# Patient Record
Sex: FEMALE | Race: WHITE | HISPANIC OR LATINO
[De-identification: names, ages, dates, MRNs, and addresses within clinical notes are randomized per-mention and may not be internally consistent; named-entity substitution may affect disease eponyms.]

---
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
## 2022-01-27 ENCOUNTER — APPOINTMENT (OUTPATIENT)
Dept: NEUROLOGY | Facility: CLINIC | Age: 22
End: 2022-01-27
Payer: MEDICAID

## 2022-01-27 VITALS
OXYGEN SATURATION: 99 % | WEIGHT: 113 LBS | HEIGHT: 59 IN | HEART RATE: 76 BPM | DIASTOLIC BLOOD PRESSURE: 68 MMHG | SYSTOLIC BLOOD PRESSURE: 108 MMHG | BODY MASS INDEX: 22.78 KG/M2 | TEMPERATURE: 98.1 F

## 2022-01-27 DIAGNOSIS — Z83.3 FAMILY HISTORY OF DIABETES MELLITUS: ICD-10-CM

## 2022-01-27 DIAGNOSIS — Z80.9 FAMILY HISTORY OF MALIGNANT NEOPLASM, UNSPECIFIED: ICD-10-CM

## 2022-01-27 DIAGNOSIS — Z82.49 FAMILY HISTORY OF ISCHEMIC HEART DISEASE AND OTHER DISEASES OF THE CIRCULATORY SYSTEM: ICD-10-CM

## 2022-01-27 PROCEDURE — 99203 OFFICE O/P NEW LOW 30 MIN: CPT

## 2022-01-27 NOTE — CONSULT LETTER
[Dear  ___] : Dear  [unfilled], [Courtesy Letter:] : I had the pleasure of seeing your patient, [unfilled], in my office today. [Please see my note below.] : Please see my note below. [Sincerely,] : Sincerely, [FreeTextEntry3] : Angelica Ordonez NP\par [facility] [place]\par [address] [place] [address]\par Phone: [phone]; Fax: [phone]

## 2022-01-27 NOTE — REVIEW OF SYSTEMS
[Hand Weakness] :  hand weakness [Dizziness] : dizziness [Anxiety] : anxiety [Confused or Disoriented] : no confusion [Memory Lapses or Loss] : no memory loss [Decr. Concentrating Ability] : no decrease in concentrating ability [Difficulty with Language] : no ~M difficulty with language [Facial Weakness] : no facial weakness [Arm Weakness] : no arm weakness [Leg Weakness] : no leg weakness [Poor Coordination] : good coordination [Numbness] : no numbness [Seizures] : no convulsions [Difficulty Walking] : no difficulty walking [Frequent Falls] : not falling [Depression] : no depression [Eye Pain] : no eye pain [Eyesight Problems] : no eyesight problems [Earache] : no earache [Loss Of Hearing] : no hearing loss [Chest Pain] : no chest pain [Palpitations] : no palpitations [Cough] : no cough [Constipation] : no constipation [Diarrhea] : no diarrhea [Dysuria] : no dysuria [de-identified] : Chronic hand weakness secondary to surgery [FreeTextEntry4] : Occasional faint ringing in the years

## 2022-01-27 NOTE — DISCUSSION/SUMMARY
[FreeTextEntry1] : Ms. Jensen is a 21 year-old woman with no pertinent medical history who presents for evaluation of worsening headaches. Headaches are presumably tension-type. Neurological exam is intact. Perform MRI head to rule-out secondary cause of worsening headaches. Begin nortriptyline 10mg QHS x 2 weeks, then begin taking 20mg QHS. Continue Tylenol as needed. Follow-up with me in 4-6 weeks. \par \par She was instructed to call the office if her condition worsens, medication fails to improve symptoms or she experiences any new or concerning symptoms. She was educated on safety precautions and side effects of medication.  All of patient's questions and concerns were addressed. \par \par

## 2022-01-27 NOTE — HISTORY OF PRESENT ILLNESS
[FreeTextEntry1] : Ms. Jensen is a 21 year-old woman with no peritnent medical history who presents for evaluation of worsening headaches. She states that headaches began about a year ago but have recently increased in frequency and severity. She reports headaches occur in various areas on her head, including across her forehead, temples and posterior head and cause a pinching or stabbing-like pain that occurs daily. She denies associated light or sound sensitivity, nausea or vomiting. She takes Tylenol with moderate relief of pain. She reports occasional dizziness. She denies other new or concerning neurological symptoms. \par \par

## 2022-01-27 NOTE — PHYSICAL EXAM
[FreeTextEntry1] : GENERAL PHYSICAL EXAM:\par GEN: no distress, normal affect\par HEENT: NCAT, OP clear\par EYES: sclera white, conjunctiva clear, no nystagmus\par NECK: supple\par CV: normal rhythm\par PULM: no respiratory distress, normal rhythm and effort\par EXT: no edema, no cyanosis\par MSK: muscle tone and strength normal\par SKIN: warm, dry, no rash or lesion on exposed skin \par \par NEUROLOGICAL EXAM:\par Mental Status\par Orientation: alert and oriented to person, place, time, and situation\par Language: clear and fluent, intact comprehension and repetition, intact naming and reading\par \par Cranial Nerves\par II: visual fields full to confrontation \par III, IV, VI: PERRL, EOMI\par V, VII: facial sensation and movement intact and symmetric \par VIII: hearing intact \par IX, X: uvula midline, soft palate elevates normally \par XI: BL shoulder shrug intact \par XII: tongue midline\par \par Motor\par Shoulder abd: 5 (R), 5 (L)\par EF/EE: 5 (R), 5 (L)\par hand : 5 (R), 5 (L)\par HF/HE: 5 (R), 5 (L)\par KF/KE: 5 (R), 5 (L)\par DF/PF: 5 (R), 5 (L) \par Tone and bulk are normal in upper and lower limbs\par No pronator drift\par \par Sensation\par Intact to light touch in all 4 EXTs\par \par Reflex\par 2+ in BL biceps, brachioradialis, patella\par \par Coordination\par Normal FTN bilaterally\par Dysdiadochokinesia not present. \par Able to perform rapid, alternating movements\par \par Gait\par Normal stance, stride, and pivot turn\par Tandem walk intact\par Negative Romberg

## 2022-02-22 ENCOUNTER — APPOINTMENT (OUTPATIENT)
Dept: NEUROLOGY | Facility: CLINIC | Age: 22
End: 2022-02-22
Payer: MEDICAID

## 2022-02-22 VITALS
DIASTOLIC BLOOD PRESSURE: 68 MMHG | HEIGHT: 59 IN | TEMPERATURE: 97.6 F | OXYGEN SATURATION: 98 % | SYSTOLIC BLOOD PRESSURE: 102 MMHG | HEART RATE: 77 BPM | WEIGHT: 113 LBS | BODY MASS INDEX: 22.78 KG/M2

## 2022-02-22 PROCEDURE — 99213 OFFICE O/P EST LOW 20 MIN: CPT

## 2022-02-22 NOTE — REVIEW OF SYSTEMS
[Hand Weakness] :  hand weakness [Dizziness] : dizziness [Anxiety] : anxiety [Confused or Disoriented] : no confusion [Memory Lapses or Loss] : no memory loss [Decr. Concentrating Ability] : no decrease in concentrating ability [Difficulty with Language] : no ~M difficulty with language [Facial Weakness] : no facial weakness [Arm Weakness] : no arm weakness [Leg Weakness] : no leg weakness [Poor Coordination] : good coordination [Numbness] : no numbness [Seizures] : no convulsions [Difficulty Walking] : no difficulty walking [Frequent Falls] : not falling [Depression] : no depression [Eye Pain] : no eye pain [Eyesight Problems] : no eyesight problems [Earache] : no earache [Loss Of Hearing] : no hearing loss [Chest Pain] : no chest pain [Palpitations] : no palpitations [Cough] : no cough [Constipation] : no constipation [Diarrhea] : no diarrhea [Dysuria] : no dysuria [de-identified] : Chronic hand weakness secondary to surgery [FreeTextEntry4] : Occasional faint ringing in the years

## 2022-02-22 NOTE — DISCUSSION/SUMMARY
[FreeTextEntry1] : Ms. Jensen is a 21 year-old woman with no pertinent medical history who presented for follow-up of headaches. MRI head unremarkable. Continue nortriptyline 10mg QHS. Follow-up with me in 6 months or sooner should the need arise.  \par \par She was instructed to call the office if her condition worsens, medication fails to improve symptoms or she experiences any new or concerning symptoms. She was educated on safety precautions and side effects of medication.  All of patient's questions and concerns were addressed. \par \par

## 2022-02-22 NOTE — HISTORY OF PRESENT ILLNESS
[FreeTextEntry1] : INTERIM HISTORY: Since her last visit, MRI head was performed and was unremarkable. Headaches greatly improved with nortriptyline 10mg QHS. \par \par INITIAL OFFICE VISIT 1/27/22: Ms. Jensen is a 21 year-old woman with no peritnent medical history who presents for evaluation of worsening headaches. She states that headaches began about a year ago but have recently increased in frequency and severity. She reports headaches occur in various areas on her head, including across her forehead, temples and posterior head and cause a pinching or stabbing-like pain that occurs daily. She denies associated light or sound sensitivity, nausea or vomiting. She takes Tylenol with moderate relief of pain. She reports occasional dizziness. She denies other new or concerning neurological symptoms. \par \par

## 2022-02-22 NOTE — CONSULT LETTER
[Dear  ___] : Dear  [unfilled], [Courtesy Letter:] : I had the pleasure of seeing your patient, [unfilled], in my office today. [Please see my note below.] : Please see my note below. [Sincerely,] : Sincerely, [FreeTextEntry3] : Angelica Ordonez NP\par [facility] [place]\par [address] [place] [address]\par Phone: [phone]; Fax: [phone]

## 2022-04-09 ENCOUNTER — TRANSCRIPTION ENCOUNTER (OUTPATIENT)
Age: 22
End: 2022-04-09

## 2022-05-18 ENCOUNTER — NON-APPOINTMENT (OUTPATIENT)
Age: 22
End: 2022-05-18

## 2022-08-19 ENCOUNTER — APPOINTMENT (OUTPATIENT)
Dept: NEUROLOGY | Facility: CLINIC | Age: 22
End: 2022-08-19

## 2022-08-19 VITALS
BODY MASS INDEX: 22.18 KG/M2 | TEMPERATURE: 98.2 F | HEART RATE: 77 BPM | WEIGHT: 110 LBS | HEIGHT: 59 IN | SYSTOLIC BLOOD PRESSURE: 106 MMHG | DIASTOLIC BLOOD PRESSURE: 70 MMHG | OXYGEN SATURATION: 99 %

## 2022-08-19 PROCEDURE — 99213 OFFICE O/P EST LOW 20 MIN: CPT

## 2022-08-19 NOTE — HISTORY OF PRESENT ILLNESS
[FreeTextEntry1] : INTERIM HISTORY: Since her last visit, Ms. Jensen has been well. She had noted relief of headaches on nortriptyline until hitting her head last week on a shower curtain yaniv. She denies LOC or "seeing stars". Since then she reports sharp right frontal headache. Pain is about 5/10 and moderately relieved with Tylenol. She denies any other new or concerning neurological symptoms. \par \par INITIAL OFFICE VISIT 1/27/22: Ms. Jensen is a 21 year-old woman with no pertinent medical history who presents for evaluation of worsening headaches. She states that headaches began about a year ago but have recently increased in frequency and severity. She reports headaches occur in various areas on her head, including across her forehead, temples and posterior head and cause a pinching or stabbing-like pain that occurs daily. She denies associated light or sound sensitivity, nausea or vomiting. She takes Tylenol with moderate relief of pain. She reports occasional dizziness. She denies other new or concerning neurological symptoms. \par \par 2/22/22: Since her last visit, MRI head was performed and was unremarkable. Headaches greatly improved with nortriptyline 10mg QHS. \par \par \par

## 2022-08-19 NOTE — CONSULT LETTER
[Dear  ___] : Dear  [unfilled], [Courtesy Letter:] : I had the pleasure of seeing your patient, [unfilled], in my office today. [Please see my note below.] : Please see my note below. [Sincerely,] : Sincerely, [FreeTextEntry3] : Angelica Ordonez NP\par [facility] [place]\par [address] [place] [address]\par Phone: [phone]; Fax: [phone]

## 2022-08-19 NOTE — REVIEW OF SYSTEMS
[Hand Weakness] :  hand weakness [Dizziness] : dizziness [Anxiety] : anxiety [Confused or Disoriented] : no confusion [Memory Lapses or Loss] : no memory loss [Decr. Concentrating Ability] : no decrease in concentrating ability [Difficulty with Language] : no ~M difficulty with language [Facial Weakness] : no facial weakness [Arm Weakness] : no arm weakness [Leg Weakness] : no leg weakness [Poor Coordination] : good coordination [Numbness] : no numbness [Seizures] : no convulsions [Difficulty Walking] : no difficulty walking [Frequent Falls] : not falling [Depression] : no depression [Eye Pain] : no eye pain [Eyesight Problems] : no eyesight problems [Earache] : no earache [Loss Of Hearing] : no hearing loss [Chest Pain] : no chest pain [Palpitations] : no palpitations [Cough] : no cough [Constipation] : no constipation [Diarrhea] : no diarrhea [Dysuria] : no dysuria [de-identified] : Chronic hand weakness secondary to surgery [FreeTextEntry4] : Occasional faint ringing in the years

## 2022-10-13 NOTE — DISCUSSION/SUMMARY
[FreeTextEntry1] : Ms. Jensen is a 21 year-old woman with no pertinent medical history who presented for follow-up of headaches. MRI head unremarkable. Neurological exam intact. Continue nortriptyline 10mg QHS for now. We discussed increasing dose of nortriptyline to 20mg QHS if symptoms do not improve after a couple of weeks or if Tylenol/OTC NSAIDs does not manage discomfort. Follow-up with me in 6 months or sooner should the need arise.  \par \par She was instructed to call the office if her condition worsens, medication fails to improve symptoms or she experiences any new or concerning symptoms. She was educated on safety precautions and side effects of medication.  All of patient's questions and concerns were addressed. \par \par 
Prenatal Vitamins

## 2022-11-18 ENCOUNTER — APPOINTMENT (OUTPATIENT)
Dept: NEUROLOGY | Facility: CLINIC | Age: 22
End: 2022-11-18

## 2022-11-18 VITALS
HEART RATE: 85 BPM | HEIGHT: 59 IN | DIASTOLIC BLOOD PRESSURE: 71 MMHG | SYSTOLIC BLOOD PRESSURE: 104 MMHG | WEIGHT: 109 LBS | BODY MASS INDEX: 21.97 KG/M2 | OXYGEN SATURATION: 99 % | TEMPERATURE: 98.6 F

## 2022-11-18 PROCEDURE — 99213 OFFICE O/P EST LOW 20 MIN: CPT

## 2022-11-18 NOTE — CONSULT LETTER
[Dear  ___] : Dear  [unfilled], [Courtesy Letter:] : I had the pleasure of seeing your patient, [unfilled], in my office today. [Please see my note below.] : Please see my note below. [Sincerely,] : Sincerely, [FreeTextEntry3] : Angelica Ordonez NP\par [facility] [place]\par [address] [place] [address]\par Phone: [phone]; Fax: [phone]

## 2022-11-18 NOTE — DISCUSSION/SUMMARY
[FreeTextEntry1] : Ms. Jensen is a 22 year-old woman with no pertinent medical history who presented for follow-up of headaches. MRI head unremarkable. Neurological exam intact. Facial paresthesias likely 2/2 palpation and compression of trigeminal nerve. Continue nortriptyline 10mg QHS for now.  We discussed increasing dose of nortriptyline and evaluation if symptoms do not improve after a couple of weeks. Follow-up with me in 6 months or sooner should the need arise.  \par \par She was instructed to call the office if her condition worsens, medication fails to improve symptoms or she experiences any new or concerning symptoms. She was educated on safety precautions and side effects of medication.  All of patient's questions and concerns were addressed. \par \par

## 2022-11-18 NOTE — REVIEW OF SYSTEMS
[Hand Weakness] :  hand weakness [Dizziness] : dizziness [Anxiety] : anxiety [Confused or Disoriented] : no confusion [Memory Lapses or Loss] : no memory loss [Decr. Concentrating Ability] : no decrease in concentrating ability [Difficulty with Language] : no ~M difficulty with language [Facial Weakness] : no facial weakness [Arm Weakness] : no arm weakness [Leg Weakness] : no leg weakness [Poor Coordination] : good coordination [Numbness] : no numbness [Seizures] : no convulsions [Difficulty Walking] : no difficulty walking [Frequent Falls] : not falling [Depression] : no depression [Eye Pain] : no eye pain [Eyesight Problems] : no eyesight problems [Earache] : no earache [Loss Of Hearing] : no hearing loss [Chest Pain] : no chest pain [Palpitations] : no palpitations [Cough] : no cough [Constipation] : no constipation [Diarrhea] : no diarrhea [Dysuria] : no dysuria [de-identified] : Chronic hand weakness secondary to surgery [FreeTextEntry4] : Occasional faint ringing in the years

## 2022-11-18 NOTE — HISTORY OF PRESENT ILLNESS
[FreeTextEntry1] : INTERIM HISTORY: Since her last visit, Ms. Jensen has been well. She is rarely experiencing breakthrough headaches on nortriptyline. When she does they occur in the morning, resolve within a few minutes and do not require as needed medications. She reports tingling sensation to bilateral sides of face, from temple to upper cheek and around chin when palpating area just under temples. She states that this has occurred before and went away on its own. She denies any other new or concerning neurological symptoms. \par \par INITIAL OFFICE VISIT 1/27/22: Ms. Jensen is a 21 year-old woman with no pertinent medical history who presents for evaluation of worsening headaches. She states that headaches began about a year ago but have recently increased in frequency and severity. She reports headaches occur in various areas on her head, including across her forehead, temples and posterior head and cause a pinching or stabbing-like pain that occurs daily. She denies associated light or sound sensitivity, nausea or vomiting. She takes Tylenol with moderate relief of pain. She reports occasional dizziness. She denies other new or concerning neurological symptoms. \par \par 2/22/22: Since her last visit, MRI head was performed and was unremarkable. Headaches greatly improved with nortriptyline 10mg QHS. \par \par 8/18/22: Since her last visit, Ms. Jensen has been well. She had noted relief of headaches on nortriptyline until hitting her head last week on a shower curtain yaniv. She denies LOC or "seeing stars". Since then she reports sharp right frontal headache. Pain is about 5/10 and moderately relieved with Tylenol. She denies any other new or concerning neurological symptoms. \par \par \par

## 2023-05-26 ENCOUNTER — APPOINTMENT (OUTPATIENT)
Dept: NEUROLOGY | Facility: CLINIC | Age: 23
End: 2023-05-26
Payer: MEDICAID

## 2023-05-26 VITALS
HEART RATE: 68 BPM | WEIGHT: 103.5 LBS | SYSTOLIC BLOOD PRESSURE: 106 MMHG | DIASTOLIC BLOOD PRESSURE: 72 MMHG | BODY MASS INDEX: 20.87 KG/M2 | TEMPERATURE: 97.9 F | HEIGHT: 59 IN | OXYGEN SATURATION: 98 %

## 2023-05-26 VITALS — SYSTOLIC BLOOD PRESSURE: 106 MMHG | DIASTOLIC BLOOD PRESSURE: 72 MMHG

## 2023-05-26 VITALS — DIASTOLIC BLOOD PRESSURE: 74 MMHG | SYSTOLIC BLOOD PRESSURE: 105 MMHG

## 2023-05-26 VITALS — DIASTOLIC BLOOD PRESSURE: 71 MMHG | SYSTOLIC BLOOD PRESSURE: 102 MMHG

## 2023-05-26 PROCEDURE — 99213 OFFICE O/P EST LOW 20 MIN: CPT

## 2023-05-26 NOTE — HISTORY OF PRESENT ILLNESS
[FreeTextEntry1] : INTERIM HISTORY: Headaches remain well managed with nortripyline 10mg at bedtime. She continues to experience sensation of numbness/tingling just below and round her cheeks bilaterally. \par \par INITIAL OFFICE VISIT 1/27/22: Ms. Jensen is a 21 year-old woman with no pertinent medical history who presents for evaluation of worsening headaches. She states that headaches began about a year ago but have recently increased in frequency and severity. She reports headaches occur in various areas on her head, including across her forehead, temples and posterior head and cause a pinching or stabbing-like pain that occurs daily. She denies associated light or sound sensitivity, nausea or vomiting. She takes Tylenol with moderate relief of pain. She reports occasional dizziness. She denies other new or concerning neurological symptoms. \par \par 2/22/22: Since her last visit, MRI head was performed and was unremarkable. Headaches greatly improved with nortriptyline 10mg QHS. \par \par 8/18/22: Since her last visit, Ms. Jensen has been well. She had noted relief of headaches on nortriptyline until hitting her head last week on a shower curtain yaniv. She denies LOC or "seeing stars". Since then she reports sharp right frontal headache. Pain is about 5/10 and moderately relieved with Tylenol. She denies any other new or concerning neurological symptoms. \par \par 11/18/22: Since her last visit, Ms. Jensen has been well. She is rarely experiencing breakthrough headaches on nortriptyline. When she does they occur in the morning, resolve within a few minutes and do not require as needed medications. She reports tingling sensation to bilateral sides of face, from temple to upper cheek and around chin when palpating area just under temples. She states that this has occurred before and went away on its own. She denies any other new or concerning neurological symptoms. \par \par

## 2023-05-26 NOTE — CONSULT LETTER
[Dear  ___] : Dear  [unfilled], [Courtesy Letter:] : I had the pleasure of seeing your patient, [unfilled], in my office today. [Please see my note below.] : Please see my note below. [Sincerely,] : Sincerely, [FreeTextEntry3] : Angelica Ordonez NP\par [facility] [place]\par [address] [place] [address]\par Phone: [phone]; Fax: [phone]

## 2023-05-26 NOTE — DISCUSSION/SUMMARY
[FreeTextEntry1] : Ms. Jensen is a 22 year-old woman with no pertinent medical history who presented for follow-up of headaches. MRI head unremarkable. Neurological exam intact. Unclear etiology of facial paresthesias as symptoms are bilateral, intermittent and not associated with weakness or headaches. Continue nortriptyline 10mg QHS for now.  Follow-up with me in 6 months or sooner should the need arise.  \par \par She was instructed to call the office if her condition worsens, medication fails to improve symptoms or she experiences any new or concerning symptoms. She was educated on safety precautions and side effects of medication.  All of patient's questions and concerns were addressed. \par \par

## 2023-06-13 ENCOUNTER — NON-APPOINTMENT (OUTPATIENT)
Age: 23
End: 2023-06-13

## 2023-06-13 ENCOUNTER — APPOINTMENT (OUTPATIENT)
Dept: RHEUMATOLOGY | Facility: CLINIC | Age: 23
End: 2023-06-13
Payer: MEDICAID

## 2023-06-13 VITALS
WEIGHT: 101 LBS | TEMPERATURE: 97.3 F | OXYGEN SATURATION: 98 % | HEIGHT: 59 IN | BODY MASS INDEX: 20.36 KG/M2 | SYSTOLIC BLOOD PRESSURE: 100 MMHG | DIASTOLIC BLOOD PRESSURE: 42 MMHG | HEART RATE: 79 BPM

## 2023-06-13 DIAGNOSIS — I73.00 RAYNAUD'S SYNDROME W/OUT GANGRENE: ICD-10-CM

## 2023-06-13 PROCEDURE — 99204 OFFICE O/P NEW MOD 45 MIN: CPT

## 2023-06-13 NOTE — HISTORY OF PRESENT ILLNESS
[Weight Loss] : weight loss [Malar Facial Rash] : no malar facial rash [Skin Lesions] : no lesions [Skin Nodules] : no skin nodules [Oral Ulcers] : no oral ulcers [Dry Mouth] : no dry mouth [Arthralgias] : no arthralgias [Dry Eyes] : dry eyes [FreeTextEntry1] : Raynauds

## 2023-06-13 NOTE — REVIEW OF SYSTEMS
[Fever] : no fever [Chills] : no chills [Recent Weight Loss (___ Lbs)] : recent [unfilled] ~Ulb weight loss [Dry Eyes] : dryness of the eyes [Skin Lesions] : no skin lesions [As Noted in HPI] : as noted in HPI [Negative] : Heme/Lymph [de-identified] : Raynauds in hands and toes [de-identified] : chronic headaches

## 2023-06-13 NOTE — PHYSICAL EXAM
[General Appearance - Alert] : alert [General Appearance - In No Acute Distress] : in no acute distress [General Appearance - Well Developed] : well developed [General Appearance - Well-Appearing] : healthy appearing [Sclera] : the sclera and conjunctiva were normal [Extraocular Movements] : extraocular movements were intact [Outer Ear] : the ears and nose were normal in appearance [Neck Appearance] : the appearance of the neck was normal [Exaggerated Use Of Accessory Muscles For Inspiration] : no accessory muscle use [Edema] : there was no peripheral edema [Musculoskeletal - Swelling] : no joint swelling seen [FreeTextEntry1] : no joint tenderness or synovitis noted on exam today; full ROM of the joints. No significant hyperextensibility noted of the joints. [] : no rash [Skin Color & Pigmentation] : normal skin color and pigmentation [Skin Lesions] : no skin lesions [No Focal Deficits] : no focal deficits [Oriented To Time, Place, And Person] : oriented to person, place, and time [Affect] : the affect was normal [Mood] : the mood was normal

## 2023-06-13 NOTE — ASSESSMENT
[FreeTextEntry1] : 22F with chronic headaches, reported orthostatic hypotension, here for evaluation of Raynauds.\par Pt with red/bluish discoloration of b/l hands in cold, occasionally toes as well. Generally resolve with hand/foot warming.\par \par Patient was advised that Raynauds can be primary (occur as an isolated condition) or secondary to an autoimmune condition such as SLE, Sjogrens, scleroderma or APLS. Given patient's young age and symptoms, will test for these above conditions.\par \par Patient was advised to keep hands/feet warm and to call our office if she notes any digital ulcerations because then treatment will be necessary. At this time, due to her low blood pressure and relatively mild symptoms, calcium channel blockers are contraindicated. \par \par Further plan to follow pending results of labs.

## 2023-06-16 ENCOUNTER — LABORATORY RESULT (OUTPATIENT)
Age: 23
End: 2023-06-16

## 2023-06-22 ENCOUNTER — NON-APPOINTMENT (OUTPATIENT)
Age: 23
End: 2023-06-22

## 2023-06-22 LAB
ALBUMIN SERPL ELPH-MCNC: 4.7 G/DL
ALP BLD-CCNC: 61 U/L
ALT SERPL-CCNC: 13 U/L
ANA SER IF-ACNC: NEGATIVE
ANION GAP SERPL CALC-SCNC: 10 MMOL/L
APPEARANCE: CLEAR
AST SERPL-CCNC: 17 U/L
B2 GLYCOPROT1 AB SER QL: NEGATIVE
BILIRUB SERPL-MCNC: 0.4 MG/DL
BILIRUBIN URINE: NEGATIVE
BLOOD URINE: NEGATIVE
BUN SERPL-MCNC: 10 MG/DL
C3 SERPL-MCNC: 104 MG/DL
C4 SERPL-MCNC: 13 MG/DL
CALCIUM SERPL-MCNC: 9.7 MG/DL
CARDIOLIPIN AB SER IA-ACNC: NEGATIVE
CENTROMERE IGG SER-ACNC: <0.2 AL
CHLORIDE SERPL-SCNC: 107 MMOL/L
CO2 SERPL-SCNC: 25 MMOL/L
COLOR: YELLOW
CREAT SERPL-MCNC: 0.69 MG/DL
CREAT SPEC-SCNC: 244 MG/DL
CREAT/PROT UR: 0.1 RATIO
CRP SERPL-MCNC: <3 MG/L
DSDNA AB SER-ACNC: <12 IU/ML
EGFR: 127 ML/MIN/1.73M2
ENA RNP AB SER IA-ACNC: <0.2 AL
ENA SCL70 IGG SER IA-ACNC: <0.2 AL
ENA SM AB SER IA-ACNC: <0.2 AL
ENA SS-A AB SER IA-ACNC: <0.2 AL
ENA SS-B AB SER IA-ACNC: <0.2 AL
ERYTHROCYTE [SEDIMENTATION RATE] IN BLOOD BY WESTERGREN METHOD: 16 MM/HR
GLUCOSE QUALITATIVE U: NEGATIVE MG/DL
GLUCOSE SERPL-MCNC: 88 MG/DL
KETONES URINE: NEGATIVE MG/DL
LEUKOCYTE ESTERASE URINE: NEGATIVE
NITRITE URINE: NEGATIVE
PH URINE: 5.5
POTASSIUM SERPL-SCNC: 4.1 MMOL/L
PROT SERPL-MCNC: 7.3 G/DL
PROT UR-MCNC: 18 MG/DL
PROTEIN URINE: NORMAL MG/DL
RNA POLYMERASE III IGG: 8 UNITS
SODIUM SERPL-SCNC: 142 MMOL/L
SPECIFIC GRAVITY URINE: 1.03
UROBILINOGEN URINE: 0.2 MG/DL

## 2023-08-25 ENCOUNTER — RX ONLY (RX ONLY)
Age: 23
End: 2023-08-25

## 2023-08-25 ENCOUNTER — OFFICE (OUTPATIENT)
Dept: URBAN - METROPOLITAN AREA CLINIC 35 | Facility: CLINIC | Age: 23
Setting detail: OPHTHALMOLOGY
End: 2023-08-25
Payer: COMMERCIAL

## 2023-08-25 DIAGNOSIS — H00.025: ICD-10-CM

## 2023-08-25 DIAGNOSIS — H16.223: ICD-10-CM

## 2023-08-25 DIAGNOSIS — H52.13: ICD-10-CM

## 2023-08-25 DIAGNOSIS — H00.022: ICD-10-CM

## 2023-08-25 PROBLEM — H53.001 AMBLYOPIA; RIGHT EYE: Status: ACTIVE | Noted: 2023-08-25

## 2023-08-25 PROCEDURE — SCREF LASIK EVAL: Performed by: OPHTHALMOLOGY

## 2023-08-25 ASSESSMENT — REFRACTION_MANIFEST
OS_SPHERE: -6.00
OS_VA1: 20/20
OS_AXIS: 080
OD_SPHERE: -4.75
OD_AXIS: 090
OD_CYLINDER: +1.50
OD_VA1: 20/30-2
OS_VA1: 20/15-2
OD_AXIS: 095
OS_AXIS: 080
OD_VA1: 20/40+
OD_SPHERE: -5.00
OS_CYLINDER: +1.00
OS_SPHERE: -5.50
OD_CYLINDER: +1.50
OS_CYLINDER: +1.25

## 2023-08-25 ASSESSMENT — LID EXAM ASSESSMENTS
OD_COMMENTS: SHORTENED GLANDS
OS_COMMENTS: SHORTENED GLANDS
OD_MEIBOMITIS: RLL
OS_MEIBOMITIS: LLL

## 2023-08-25 ASSESSMENT — REFRACTION_AUTOREFRACTION
OS_SPHERE: -6.50
OD_CYLINDER: +1.75
OS_AXIS: 087
OD_AXIS: 089
OD_SPHERE: -5.50
OS_CYLINDER: +1.50

## 2023-08-25 ASSESSMENT — SPHEQUIV_DERIVED
OD_SPHEQUIV: -4
OD_SPHEQUIV: -4.625
OS_SPHEQUIV: -5
OS_SPHEQUIV: -5.375
OS_SPHEQUIV: -5.75
OD_SPHEQUIV: -4.25

## 2023-08-25 ASSESSMENT — REFRACTION_CURRENTRX
OS_SPHERE: -5.50
OS_CYLINDER: +1.25
OD_CYLINDER: +1.25
OS_VPRISM_DIRECTION: SV
OS_AXIS: 087
OD_AXIS: 101
OD_SPHERE: -4.75
OD_VPRISM_DIRECTION: SV
OS_OVR_VA: 20/
OD_OVR_VA: 20/

## 2023-08-25 ASSESSMENT — AXIALLENGTH_DERIVED
OS_AL: 26.2158
OS_AL: 26.5816
OD_AL: 26.2622
OD_AL: 26.0824
OS_AL: 26.3974
OD_AL: 25.9639

## 2023-08-25 ASSESSMENT — VISUAL ACUITY
OD_BCVA: 20/25-
OS_BCVA: 20/40+

## 2023-08-25 ASSESSMENT — KERATOMETRY
OD_K2POWER_DIOPTERS: 42.50
OD_K1POWER_DIOPTERS: 41.25
OS_K2POWER_DIOPTERS: 43.00
OS_K1POWER_DIOPTERS: 41.75
OS_AXISANGLE_DEGREES: 075
OD_AXISANGLE_DEGREES: 096

## 2023-08-25 ASSESSMENT — SUPERFICIAL PUNCTATE KERATITIS (SPK)
OD_SPK: 1+
OS_SPK: 1+

## 2023-08-25 ASSESSMENT — CONFRONTATIONAL VISUAL FIELD TEST (CVF)
OD_FINDINGS: FULL
OS_FINDINGS: FULL

## 2023-10-12 ENCOUNTER — APPOINTMENT (OUTPATIENT)
Dept: INTERVENTIONAL RADIOLOGY/VASCULAR | Facility: CLINIC | Age: 23
End: 2023-10-12

## 2023-10-13 ENCOUNTER — APPOINTMENT (OUTPATIENT)
Dept: ORTHOPEDIC SURGERY | Facility: CLINIC | Age: 23
End: 2023-10-13
Payer: MEDICAID

## 2023-10-13 VITALS
DIASTOLIC BLOOD PRESSURE: 72 MMHG | HEIGHT: 59 IN | BODY MASS INDEX: 20.36 KG/M2 | WEIGHT: 101 LBS | SYSTOLIC BLOOD PRESSURE: 115 MMHG | HEART RATE: 87 BPM

## 2023-10-13 DIAGNOSIS — M79.646 PAIN IN UNSPECIFIED HAND: ICD-10-CM

## 2023-10-13 DIAGNOSIS — M25.20 FLAIL JOINT, UNSPECIFIED JOINT: ICD-10-CM

## 2023-10-13 DIAGNOSIS — M79.643 PAIN IN UNSPECIFIED HAND: ICD-10-CM

## 2023-10-13 PROCEDURE — 99203 OFFICE O/P NEW LOW 30 MIN: CPT | Mod: 25

## 2023-10-13 PROCEDURE — 73130 X-RAY EXAM OF HAND: CPT | Mod: 50

## 2023-10-21 ENCOUNTER — OFFICE (OUTPATIENT)
Dept: URBAN - METROPOLITAN AREA CLINIC 34 | Facility: CLINIC | Age: 23
Setting detail: OPHTHALMOLOGY
End: 2023-10-21
Payer: COMMERCIAL

## 2023-10-21 DIAGNOSIS — H16.222: ICD-10-CM

## 2023-10-21 DIAGNOSIS — H52.13: ICD-10-CM

## 2023-10-21 DIAGNOSIS — H00.025: ICD-10-CM

## 2023-10-21 DIAGNOSIS — H00.022: ICD-10-CM

## 2023-10-21 DIAGNOSIS — H53.001: ICD-10-CM

## 2023-10-21 DIAGNOSIS — H35.40: ICD-10-CM

## 2023-10-21 PROCEDURE — SCREF LASIK EVAL: Performed by: OPHTHALMOLOGY

## 2023-10-21 ASSESSMENT — LID EXAM ASSESSMENTS
OS_COMMENTS: SHORTENED GLANDS
OS_MEIBOMITIS: LLL
OD_MEIBOMITIS: RLL
OD_COMMENTS: SHORTENED GLANDS

## 2023-10-21 ASSESSMENT — CONFRONTATIONAL VISUAL FIELD TEST (CVF)
OD_FINDINGS: FULL
OS_FINDINGS: FULL

## 2023-10-23 PROBLEM — H35.40 PERIPAPILLARY ATROPHY ; RIGHT EYE: Status: ACTIVE | Noted: 2023-10-21

## 2023-10-23 ASSESSMENT — REFRACTION_CURRENTRX
OD_OVR_VA: 20/
OS_CYLINDER: +1.25
OD_SPHERE: -4.75
OS_OVR_VA: 20/
OD_AXIS: 101
OS_AXIS: 087
OS_SPHERE: -5.50
OS_VPRISM_DIRECTION: SV
OD_CYLINDER: +1.25
OD_VPRISM_DIRECTION: SV

## 2023-10-23 ASSESSMENT — SPHEQUIV_DERIVED
OS_SPHEQUIV: -5.125
OD_SPHEQUIV: -4
OD_SPHEQUIV: -4.375
OS_SPHEQUIV: -5
OD_SPHEQUIV: -4
OD_SPHEQUIV: -4.25
OS_SPHEQUIV: -5.625
OS_SPHEQUIV: -5.375

## 2023-10-23 ASSESSMENT — REFRACTION_MANIFEST
OS_VA1: 20/15-2
OD_CYLINDER: +1.50
OD_VA1: 20/40+
OD_SPHERE: -4.75
OS_CYLINDER: +1.25
OS_VA1: 20/20
OS_VA1: 20/20+2
OD_AXIS: 090
OS_CYLINDER: +1.00
OD_VA1: 20/30-2
OD_CYLINDER: +1.50
OS_AXIS: 085
OS_AXIS: 080
OD_CYLINDER: +1.50
OS_SPHERE: -5.75
OD_SPHERE: -5.00
OS_AXIS: 080
OD_VA1: 20/40-1
OD_AXIS: 090
OD_SPHERE: -4.75
OS_SPHERE: -6.00
OS_CYLINDER: +1.25
OS_SPHERE: -5.50
OD_AXIS: 095

## 2023-10-23 ASSESSMENT — REFRACTION_AUTOREFRACTION
OS_SPHERE: -6.25
OS_AXIS: 080
OD_AXIS: 089
OS_CYLINDER: +1.25
OD_SPHERE: -5.25
OD_CYLINDER: +1.75

## 2023-10-23 ASSESSMENT — AXIALLENGTH_DERIVED
OD_AL: 25.9151
OS_AL: 26.1029
OD_AL: 25.7981
OD_AL: 25.974
OS_AL: 26.4044
OS_AL: 26.1626
OD_AL: 25.7981
OS_AL: 26.283

## 2023-10-23 ASSESSMENT — KERATOMETRY
OD_K2POWER_DIOPTERS: 43.00
OD_AXISANGLE_DEGREES: 095
OS_K2POWER_DIOPTERS: 43.25
OD_K1POWER_DIOPTERS: 41.50
OS_AXISANGLE_DEGREES: 073
OS_K1POWER_DIOPTERS: 42.00

## 2023-10-23 ASSESSMENT — VISUAL ACUITY
OD_BCVA: 20/20-1
OS_BCVA: 20/40+2

## 2024-04-01 ENCOUNTER — EMERGENCY (EMERGENCY)
Facility: HOSPITAL | Age: 24
LOS: 1 days | Discharge: HOME | End: 2024-04-01
Attending: STUDENT IN AN ORGANIZED HEALTH CARE EDUCATION/TRAINING PROGRAM | Admitting: STUDENT IN AN ORGANIZED HEALTH CARE EDUCATION/TRAINING PROGRAM
Payer: MEDICAID

## 2024-04-01 VITALS
RESPIRATION RATE: 16 BRPM | DIASTOLIC BLOOD PRESSURE: 77 MMHG | HEART RATE: 77 BPM | SYSTOLIC BLOOD PRESSURE: 118 MMHG | OXYGEN SATURATION: 99 % | HEIGHT: 59 IN | TEMPERATURE: 98 F | WEIGHT: 104.94 LBS

## 2024-04-01 DIAGNOSIS — Q66.89 OTHER SPECIFIED CONGENITAL DEFORMITIES OF FEET: Chronic | ICD-10-CM

## 2024-04-01 DIAGNOSIS — Z98.890 OTHER SPECIFIED POSTPROCEDURAL STATES: Chronic | ICD-10-CM

## 2024-04-01 DIAGNOSIS — K08.409 PARTIAL LOSS OF TEETH, UNSPECIFIED CAUSE, UNSPECIFIED CLASS: Chronic | ICD-10-CM

## 2024-04-01 LAB
ALBUMIN SERPL ELPH-MCNC: 4.2 G/DL — SIGNIFICANT CHANGE UP (ref 3.3–5)
ALP SERPL-CCNC: 65 U/L — SIGNIFICANT CHANGE UP (ref 40–120)
ALT FLD-CCNC: 27 U/L — SIGNIFICANT CHANGE UP (ref 12–78)
ANION GAP SERPL CALC-SCNC: 7 MMOL/L — SIGNIFICANT CHANGE UP (ref 5–17)
APPEARANCE UR: CLEAR — SIGNIFICANT CHANGE UP
AST SERPL-CCNC: 22 U/L — SIGNIFICANT CHANGE UP (ref 15–37)
BASOPHILS # BLD AUTO: 0.04 K/UL — SIGNIFICANT CHANGE UP (ref 0–0.2)
BASOPHILS NFR BLD AUTO: 0.6 % — SIGNIFICANT CHANGE UP (ref 0–2)
BILIRUB SERPL-MCNC: 0.4 MG/DL — SIGNIFICANT CHANGE UP (ref 0.2–1.2)
BILIRUB UR-MCNC: NEGATIVE — SIGNIFICANT CHANGE UP
BUN SERPL-MCNC: 9 MG/DL — SIGNIFICANT CHANGE UP (ref 7–23)
CALCIUM SERPL-MCNC: 8.5 MG/DL — SIGNIFICANT CHANGE UP (ref 8.5–10.1)
CHLORIDE SERPL-SCNC: 113 MMOL/L — HIGH (ref 96–108)
CO2 SERPL-SCNC: 22 MMOL/L — SIGNIFICANT CHANGE UP (ref 22–31)
COLOR SPEC: YELLOW — SIGNIFICANT CHANGE UP
CREAT SERPL-MCNC: 0.71 MG/DL — SIGNIFICANT CHANGE UP (ref 0.5–1.3)
DIFF PNL FLD: NEGATIVE — SIGNIFICANT CHANGE UP
EGFR: 124 ML/MIN/1.73M2 — SIGNIFICANT CHANGE UP
EOSINOPHIL # BLD AUTO: 0.13 K/UL — SIGNIFICANT CHANGE UP (ref 0–0.5)
EOSINOPHIL NFR BLD AUTO: 2 % — SIGNIFICANT CHANGE UP (ref 0–6)
GLUCOSE SERPL-MCNC: 82 MG/DL — SIGNIFICANT CHANGE UP (ref 70–99)
GLUCOSE UR QL: NEGATIVE MG/DL — SIGNIFICANT CHANGE UP
HCG SERPL-ACNC: <1 MIU/ML — SIGNIFICANT CHANGE UP
HCT VFR BLD CALC: 41.1 % — SIGNIFICANT CHANGE UP (ref 34.5–45)
HGB BLD-MCNC: 14 G/DL — SIGNIFICANT CHANGE UP (ref 11.5–15.5)
IMM GRANULOCYTES NFR BLD AUTO: 0.2 % — SIGNIFICANT CHANGE UP (ref 0–0.9)
KETONES UR-MCNC: ABNORMAL MG/DL
LEUKOCYTE ESTERASE UR-ACNC: ABNORMAL
LIDOCAIN IGE QN: 57 U/L — SIGNIFICANT CHANGE UP (ref 13–75)
LYMPHOCYTES # BLD AUTO: 3.14 K/UL — SIGNIFICANT CHANGE UP (ref 1–3.3)
LYMPHOCYTES # BLD AUTO: 47.9 % — HIGH (ref 13–44)
MCHC RBC-ENTMCNC: 30.9 PG — SIGNIFICANT CHANGE UP (ref 27–34)
MCHC RBC-ENTMCNC: 34.1 GM/DL — SIGNIFICANT CHANGE UP (ref 32–36)
MCV RBC AUTO: 90.7 FL — SIGNIFICANT CHANGE UP (ref 80–100)
MONOCYTES # BLD AUTO: 0.53 K/UL — SIGNIFICANT CHANGE UP (ref 0–0.9)
MONOCYTES NFR BLD AUTO: 8.1 % — SIGNIFICANT CHANGE UP (ref 2–14)
NEUTROPHILS # BLD AUTO: 2.71 K/UL — SIGNIFICANT CHANGE UP (ref 1.8–7.4)
NEUTROPHILS NFR BLD AUTO: 41.2 % — LOW (ref 43–77)
NITRITE UR-MCNC: NEGATIVE — SIGNIFICANT CHANGE UP
NRBC # BLD: 0 /100 WBCS — SIGNIFICANT CHANGE UP (ref 0–0)
PH UR: 7 — SIGNIFICANT CHANGE UP (ref 5–8)
PLATELET # BLD AUTO: 223 K/UL — SIGNIFICANT CHANGE UP (ref 150–400)
POTASSIUM SERPL-MCNC: 4.2 MMOL/L — SIGNIFICANT CHANGE UP (ref 3.5–5.3)
POTASSIUM SERPL-SCNC: 4.2 MMOL/L — SIGNIFICANT CHANGE UP (ref 3.5–5.3)
PROT SERPL-MCNC: 8.1 G/DL — SIGNIFICANT CHANGE UP (ref 6–8.3)
PROT UR-MCNC: NEGATIVE MG/DL — SIGNIFICANT CHANGE UP
RBC # BLD: 4.53 M/UL — SIGNIFICANT CHANGE UP (ref 3.8–5.2)
RBC # FLD: 12.5 % — SIGNIFICANT CHANGE UP (ref 10.3–14.5)
SODIUM SERPL-SCNC: 142 MMOL/L — SIGNIFICANT CHANGE UP (ref 135–145)
SP GR SPEC: 1.03 — SIGNIFICANT CHANGE UP (ref 1–1.03)
UROBILINOGEN FLD QL: 0.2 MG/DL — SIGNIFICANT CHANGE UP (ref 0.2–1)
WBC # BLD: 6.56 K/UL — SIGNIFICANT CHANGE UP (ref 3.8–10.5)
WBC # FLD AUTO: 6.56 K/UL — SIGNIFICANT CHANGE UP (ref 3.8–10.5)

## 2024-04-01 PROCEDURE — 85025 COMPLETE CBC W/AUTO DIFF WBC: CPT

## 2024-04-01 PROCEDURE — 99284 EMERGENCY DEPT VISIT MOD MDM: CPT | Mod: 25

## 2024-04-01 PROCEDURE — 96374 THER/PROPH/DIAG INJ IV PUSH: CPT | Mod: XU

## 2024-04-01 PROCEDURE — 96375 TX/PRO/DX INJ NEW DRUG ADDON: CPT

## 2024-04-01 PROCEDURE — 36415 COLL VENOUS BLD VENIPUNCTURE: CPT

## 2024-04-01 PROCEDURE — 84702 CHORIONIC GONADOTROPIN TEST: CPT

## 2024-04-01 PROCEDURE — 99285 EMERGENCY DEPT VISIT HI MDM: CPT

## 2024-04-01 PROCEDURE — 74177 CT ABD & PELVIS W/CONTRAST: CPT | Mod: 26,MC

## 2024-04-01 PROCEDURE — 76856 US EXAM PELVIC COMPLETE: CPT

## 2024-04-01 PROCEDURE — 74177 CT ABD & PELVIS W/CONTRAST: CPT | Mod: MC

## 2024-04-01 PROCEDURE — 83690 ASSAY OF LIPASE: CPT

## 2024-04-01 PROCEDURE — 81001 URINALYSIS AUTO W/SCOPE: CPT

## 2024-04-01 PROCEDURE — 80053 COMPREHEN METABOLIC PANEL: CPT

## 2024-04-01 PROCEDURE — 76856 US EXAM PELVIC COMPLETE: CPT | Mod: 26

## 2024-04-01 RX ORDER — SODIUM CHLORIDE 9 MG/ML
1000 INJECTION INTRAMUSCULAR; INTRAVENOUS; SUBCUTANEOUS ONCE
Refills: 0 | Status: COMPLETED | OUTPATIENT
Start: 2024-04-01 | End: 2024-04-01

## 2024-04-01 RX ORDER — ONDANSETRON 8 MG/1
4 TABLET, FILM COATED ORAL ONCE
Refills: 0 | Status: COMPLETED | OUTPATIENT
Start: 2024-04-01 | End: 2024-04-01

## 2024-04-01 RX ORDER — ACETAMINOPHEN 500 MG
725 TABLET ORAL ONCE
Refills: 0 | Status: COMPLETED | OUTPATIENT
Start: 2024-04-01 | End: 2024-04-01

## 2024-04-01 RX ADMIN — SODIUM CHLORIDE 1000 MILLILITER(S): 9 INJECTION INTRAMUSCULAR; INTRAVENOUS; SUBCUTANEOUS at 17:50

## 2024-04-01 RX ADMIN — ONDANSETRON 4 MILLIGRAM(S): 8 TABLET, FILM COATED ORAL at 17:50

## 2024-04-01 RX ADMIN — Medication 290 MILLIGRAM(S): at 17:50

## 2024-04-01 NOTE — ED PROVIDER NOTE - PATIENT PORTAL LINK FT
You can access the FollowMyHealth Patient Portal offered by [facility] by registering at the following website: [email or web address]. By joining [employer]’s FollowMyHealth portal, you will also be able to view your health information using other applications (apps) compatible with our system.

## 2024-04-01 NOTE — ED PROVIDER NOTE - PROVIDER TOKENS
FREE:[LAST:[Doc Muñoz],FIRST:[Trisha],PHONE:[(   )    -],FAX:[(   )    -]] FREE:[LAST:[Doc Muñoz],FIRST:[Trisha],PHONE:[(   )    -],FAX:[(   )    -]],PROVIDER:[TOKEN:[[id number]]]

## 2024-04-01 NOTE — ED PROVIDER NOTE - PROGRESS NOTE DETAILS
pt remains comfortable, declining additional pain medication - aware of results (labs/UA/CT) - will send for US for further eval of ovarian cyst

## 2024-04-01 NOTE — ED ADULT NURSE NOTE - OBJECTIVE STATEMENT
Pt presenting with RLQ pain. Pt a&ox4, ambulatory at baseline, denies any pain radiation, no dysuria, abdomen soft not distended. R20 dry and intact, flushes without difficulty. Resting comfortably in bed, rails up and wheels locked in place.

## 2024-04-01 NOTE — ED PROVIDER NOTE - NSFOLLOWUPINSTRUCTIONS_ED_ALL_ED_FT
Ovarian Cyst    An ovarian cyst is a fluid-filled sac on an ovary. Most of these cysts go away on their own and are not cancer. Some cysts need treatment.    What are the causes?  Ovarian hyperstimulation syndrome. Some medicines may lead to this problem.  Polycystic ovarian syndrome (PCOS). Problems with body chemicals (hormones) can lead to this condition.  The normal menstrual cycle.  What increases the risk?  Being overweight or very overweight.  Taking medicines to increase your chance of getting pregnant.  Using some types of birth control.  Smoking.  What are the signs or symptoms?  Many ovarian cysts do not cause symptoms. If you get symptoms, you may have:  Pain or pressure in the area between the hip bones.  Pain in the lower belly.  Pain during sex.  Swelling in the lower belly.  Periods that are not regular.  Pain with periods.  How is this treated?  Many ovarian cysts go away on their own without treatment. If you need treatment, it may include:  Medicines for pain.  Fluid taken out of the cyst.  The cyst being taken out.  Birth control pills or other medicines.  Surgery to remove the ovary.  Follow these instructions at home:  Take over-the-counter and prescription medicines only as told by your doctor.  Ask your doctor if you should avoid driving or using machines while you are taking your medicine.  Get exams and Pap tests as told by your doctor.  Return to your normal activities when your doctor says that it is safe.  Do not smoke or use any products that contain nicotine or tobacco. If you need help quitting, ask your doctor.  Keep all follow-up visits.  Contact a doctor if:  Your periods:  Are late.  Are not regular.  Stop.  Are painful.  You have pain in the area between your hip bones, and the pain does not go away.  You feel pressure on your bladder.  You have trouble peeing.  You feel full, or your belly hurts, swells, or bloats.  You gain or lose weight without trying, or you are less hungry than normal.  You feel pain and pressure in your back.  You feel pain and pressure in the area between your hip bones.  You think you may be pregnant.  Get help right away if:  You have pain in your belly that is very bad or gets worse.  You have pain in the area between your hip bones, and the pain is very bad or gets worse.  You cannot eat or drink without vomiting.  You get a fever or chills all of a sudden.  Your period is a lot heavier than usual.  Summary  An ovarian cyst is a fluid-filled sac on an ovary.  Some cysts may cause problems and need treatment.  Most of these cysts go away on their own.  This information is not intended to replace advice given to you by your health care provider. Make sure you discuss any questions you have with your health care provider.

## 2024-04-01 NOTE — ED PROVIDER NOTE - CLINICAL SUMMARY MEDICAL DECISION MAKING FREE TEXT BOX
Here with right lower quadrant pain and nausea.  Differential inclusive of appendicitis, colitis, UTI, pyelonephritis, ovarian pathology.  Check labs, CT, treat symptoms, consider ultrasound, reevaluate.

## 2024-04-01 NOTE — ED PROVIDER NOTE - ATTENDING APP SHARED VISIT CONTRIBUTION OF CARE
This was a shared visit with ROBERT. I reviewed and verified the documentation and independently performed the documented MDM.

## 2024-04-01 NOTE — ED PROVIDER NOTE - CARE PROVIDER_API CALL
Trisha Askew  Phone: (   )    -  Fax: (   )    -  Follow Up Time:    Trisha Askew  Phone: (   )    -  Fax: (   )    -  Follow Up Time:     Snehal Avilaagros  Obstetrics and Gynecology  [address] [place] [address]  Phone: [phone]  Fax: [phone]  Follow Up Time:

## 2024-04-01 NOTE — ED PROVIDER NOTE - CARE PROVIDERS DIRECT ADDRESSES
,DirectAddress_Unknown ,DirectAddress_Unknown,[email or web address]

## 2024-04-01 NOTE — ED PROVIDER NOTE - OBJECTIVE STATEMENT
23 F hx chronic headaches (takes nortriptyline), denies abd surgery, c/o RLQ pain x 4 days. Pain was waxing and waning, worse at night. States pain constant today. Nausea, no vomiting. Last BM today, normal. Took advil with mild relief, last dose 2 days ago. Pain unaffected by eating. Denies hx similar symptoms. Denies known hx cysts, fever, chills, cp, sob, cough, bowel/bladder complaints. LMP 3/17.

## 2024-04-08 ENCOUNTER — APPOINTMENT (OUTPATIENT)
Dept: OBGYN | Facility: CLINIC | Age: 24
End: 2024-04-08
Payer: COMMERCIAL

## 2024-04-08 VITALS
BODY MASS INDEX: 21.82 KG/M2 | HEIGHT: 59 IN | SYSTOLIC BLOOD PRESSURE: 90 MMHG | DIASTOLIC BLOOD PRESSURE: 70 MMHG | WEIGHT: 108.25 LBS

## 2024-04-08 DIAGNOSIS — R10.2 PELVIC AND PERINEAL PAIN: ICD-10-CM

## 2024-04-08 DIAGNOSIS — G44.229 CHRONIC TENSION-TYPE HEADACHE, NOT INTRACTABLE: ICD-10-CM

## 2024-04-08 DIAGNOSIS — Z82.49 FAMILY HISTORY OF ISCHEMIC HEART DISEASE AND OTHER DISEASES OF THE CIRCULATORY SYSTEM: ICD-10-CM

## 2024-04-08 DIAGNOSIS — N83.209 UNSPECIFIED OVARIAN CYST, UNSPECIFIED SIDE: ICD-10-CM

## 2024-04-08 DIAGNOSIS — Z78.9 OTHER SPECIFIED HEALTH STATUS: ICD-10-CM

## 2024-04-08 DIAGNOSIS — Z83.49 FAMILY HISTORY OF OTHER ENDOCRINE, NUTRITIONAL AND METABOLIC DISEASES: ICD-10-CM

## 2024-04-08 DIAGNOSIS — Z86.59 PERSONAL HISTORY OF OTHER MENTAL AND BEHAVIORAL DISORDERS: ICD-10-CM

## 2024-04-08 PROCEDURE — 99213 OFFICE O/P EST LOW 20 MIN: CPT

## 2024-04-08 NOTE — CHIEF COMPLAINT
[Urgent Visit] : Urgent Visit [FreeTextEntry1] : WAS SEEN IN THE E.R. 04/01/2024, HAS HEMORRHAGIC CYST WITHIN THE RIGHT OVARY.

## 2024-04-11 ENCOUNTER — APPOINTMENT (OUTPATIENT)
Dept: OBGYN | Facility: CLINIC | Age: 24
End: 2024-04-11

## 2024-04-19 ENCOUNTER — RX RENEWAL (OUTPATIENT)
Age: 24
End: 2024-04-19

## 2024-05-02 ENCOUNTER — APPOINTMENT (OUTPATIENT)
Dept: OBGYN | Facility: CLINIC | Age: 24
End: 2024-05-02
Payer: MEDICAID

## 2024-05-02 VITALS
BODY MASS INDEX: 21.17 KG/M2 | HEIGHT: 59 IN | WEIGHT: 105 LBS | HEART RATE: 80 BPM | RESPIRATION RATE: 16 BRPM | DIASTOLIC BLOOD PRESSURE: 50 MMHG | OXYGEN SATURATION: 99 % | SYSTOLIC BLOOD PRESSURE: 90 MMHG

## 2024-05-02 DIAGNOSIS — Z86.79 PERSONAL HISTORY OF OTHER DISEASES OF THE CIRCULATORY SYSTEM: ICD-10-CM

## 2024-05-02 DIAGNOSIS — Z87.42 PERSONAL HISTORY OF OTHER DISEASES OF THE FEMALE GENITAL TRACT: ICD-10-CM

## 2024-05-02 DIAGNOSIS — Z86.59 PERSONAL HISTORY OF OTHER MENTAL AND BEHAVIORAL DISORDERS: ICD-10-CM

## 2024-05-02 DIAGNOSIS — Z01.419 ENCOUNTER FOR GYNECOLOGICAL EXAMINATION (GENERAL) (ROUTINE) W/OUT ABNORMAL FINDINGS: ICD-10-CM

## 2024-05-02 LAB
HCG UR QL: NEGATIVE
QUALITY CONTROL: YES

## 2024-05-02 PROCEDURE — 99395 PREV VISIT EST AGE 18-39: CPT | Mod: 25

## 2024-05-02 PROCEDURE — 81025 URINE PREGNANCY TEST: CPT

## 2024-05-02 RX ORDER — CALCIUM CARBONATE/VITAMIN D3 600 MG-10
TABLET ORAL
Refills: 0 | Status: ACTIVE | COMMUNITY

## 2024-05-02 RX ORDER — CHOLECALCIFEROL (VITAMIN D3) 25 MCG
TABLET ORAL
Refills: 0 | Status: ACTIVE | COMMUNITY

## 2024-05-02 RX ORDER — NORTRIPTYLINE HYDROCHLORIDE 10 MG/1
10 CAPSULE ORAL
Qty: 90 | Refills: 0 | Status: DISCONTINUED | COMMUNITY
Start: 2022-02-22 | End: 2024-05-02

## 2024-05-02 NOTE — HISTORY OF PRESENT ILLNESS
[N] : Patient denies prior pregnancies [No] : Patient does not have concerns regarding sex [Never active] : never active [FreeTextEntry1] : [place] 23-year-old  female here for annual exam. never sexually active, Virginal  I saw her as follow-up from the emergency room.  Patient went to emergency room April 1 for right lower quadrant pain, thought was an appendicitis, had pelvic ultrasound that showed right ovarian likely hemorrhagic cyst 2.4 x 2 x 2.1 cm. Today she reports No more pain.  She had a pelvic ultrasound done at [facility] last week report not available Menses normal.  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

## 2024-05-02 NOTE — PLAN
[FreeTextEntry1] : 23-year-old with history of right ovarian cyst presents for annual exam.  Virginal so Pap and pelvic exam deferred.  Plan will be for Pap next year, reviewed Pap for cervical cancer screening, reviewed very low risk for abnormal Pap or cervical cancer given never sexually active.  Patient trying to access sono report from last week from [facility]- report not ready. will f/u with pt once [facility] sono report finalized

## 2024-05-02 NOTE — PHYSICAL EXAM
[TextEntry] : General appearance well-appearing no acute distress  Breast examined in the upright and supine position.  Breast exam within normal limits, no masses no lymphadenopathy nontender bilaterally  Normal external genitalia  pelvic exam deferred- pt virginal

## 2024-05-05 ENCOUNTER — NON-APPOINTMENT (OUTPATIENT)
Age: 24
End: 2024-05-05

## 2024-05-06 ENCOUNTER — APPOINTMENT (OUTPATIENT)
Dept: OBGYN | Facility: CLINIC | Age: 24
End: 2024-05-06

## 2024-05-12 ENCOUNTER — NON-APPOINTMENT (OUTPATIENT)
Age: 24
End: 2024-05-12

## 2024-05-21 ENCOUNTER — APPOINTMENT (OUTPATIENT)
Dept: NEUROLOGY | Facility: CLINIC | Age: 24
End: 2024-05-21
Payer: MEDICAID

## 2024-05-21 VITALS
WEIGHT: 103 LBS | HEART RATE: 80 BPM | BODY MASS INDEX: 20.76 KG/M2 | SYSTOLIC BLOOD PRESSURE: 99 MMHG | HEIGHT: 59 IN | OXYGEN SATURATION: 98 % | DIASTOLIC BLOOD PRESSURE: 63 MMHG

## 2024-05-21 DIAGNOSIS — R51.9 HEADACHE, UNSPECIFIED: ICD-10-CM

## 2024-05-21 PROCEDURE — 99213 OFFICE O/P EST LOW 20 MIN: CPT

## 2024-05-21 PROCEDURE — G2211 COMPLEX E/M VISIT ADD ON: CPT | Mod: NC,1L

## 2024-05-21 RX ORDER — NORTRIPTYLINE HYDROCHLORIDE 10 MG/1
10 CAPSULE ORAL
Qty: 30 | Refills: 6 | Status: ACTIVE | COMMUNITY
Start: 2022-01-27 | End: 1900-01-01

## 2024-05-21 NOTE — DISCUSSION/SUMMARY
[FreeTextEntry1] : Ms. Jensen is a 23 year-old woman with no pertinent medical history who presented for follow-up of headaches. MRI head unremarkable. Neurological exam intact. She will continue nortriptyline 10mg QHS for now and take acetaminophen PRN for breakthrough headaches. Follow-up with me in one year or sooner should the need arise.    She was instructed to call the office if her condition worsens, medication fails to improve symptoms, or she experiences any new or concerning symptoms. She was educated on safety precautions and side effects of medication.  All of patient's questions and concerns were addressed.

## 2024-05-21 NOTE — CONSULT LETTER
[Dear  ___] : Dear  [unfilled], [Courtesy Letter:] : I had the pleasure of seeing your patient, [unfilled], in my office today. [Please see my note below.] : Please see my note below. [Sincerely,] : Sincerely, [FreeTextEntry3] : Angelica Ordonez NP\par  [facility] [place]\par  [address] [place] [address]\par  Phone: [phone]; Fax: [phone]

## 2024-05-21 NOTE — REVIEW OF SYSTEMS
[Hand Weakness] :  hand weakness [Dizziness] : dizziness [Anxiety] : anxiety [Confused or Disoriented] : no confusion [Memory Lapses or Loss] : no memory loss [Decr. Concentrating Ability] : no decrease in concentrating ability [Difficulty with Language] : no ~M difficulty with language [Facial Weakness] : no facial weakness [Arm Weakness] : no arm weakness [Leg Weakness] : no leg weakness [Poor Coordination] : good coordination [Numbness] : no numbness [Seizures] : no convulsions [Difficulty Walking] : no difficulty walking [Frequent Falls] : not falling [Depression] : no depression [Eye Pain] : no eye pain [Eyesight Problems] : no eyesight problems [Earache] : no earache [Loss Of Hearing] : no hearing loss [Chest Pain] : no chest pain [Palpitations] : no palpitations [Cough] : no cough [Constipation] : no constipation [Diarrhea] : no diarrhea [Dysuria] : no dysuria [de-identified] : Chronic hand weakness secondary to surgery [FreeTextEntry4] : Occasional faint ringing in the years

## 2024-05-21 NOTE — HISTORY OF PRESENT ILLNESS
[FreeTextEntry1] : INTERIM HISTORY: Ms. Jensen is doing well. 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 She reports having about 1 headache/week and breakthrough headaches are relieved with acetaminophen. She reports some neck and back pain. Facial paresthesia has resolved. She has no further complaints.    INITIAL OFFICE VISIT 1/27/22: Ms. Jensen is a 21 year-old woman with no pertinent medical history who presents for evaluation of worsening headaches. She states that headaches began about a year ago but have recently increased in frequency and severity. She reports headaches occur in various areas on her head, including across her forehead, temples and posterior head and cause a pinching or stabbing-like pain that occurs daily. She denies associated light or sound sensitivity, nausea or vomiting. She takes Tylenol with moderate relief of pain. She reports occasional dizziness. She denies other new or concerning neurological symptoms.   2/22/22: Since her last visit, MRI head was performed and was unremarkable. Headaches greatly improved with nortriptyline 10mg QHS.   8/18/22: Since her last visit, Ms. Jensen has been well. She had noted relief of headaches on nortriptyline until hitting her head last week on a shower curtain yaniv. She denies LOC or "seeing stars". Since then she reports sharp right frontal headache. Pain is about 5/10 and moderately relieved with Tylenol. She denies any other new or concerning neurological symptoms.   11/18/22: Since her last visit, Ms. Jensen has been well. She is rarely experiencing breakthrough headaches on nortriptyline. When she does they occur in the morning, resolve within a few minutes and do not require as needed medications. She reports tingling sensation to bilateral sides of face, from temple to upper cheek and around chin when palpating area just under temples. She states that this has occurred before and went away on its own. She denies any other new or concerning neurological symptoms.   5/26/23: Headaches remain well managed with nortripyline 10mg at bedtime. She continues to experience sensation of numbness/tingling just below and round her cheeks bilaterally.

## 2024-08-13 ENCOUNTER — TRANSCRIPTION ENCOUNTER (OUTPATIENT)
Age: 24
End: 2024-08-13

## 2024-08-18 ENCOUNTER — NON-APPOINTMENT (OUTPATIENT)
Age: 24
End: 2024-08-18

## 2024-08-20 ENCOUNTER — NON-APPOINTMENT (OUTPATIENT)
Age: 24
End: 2024-08-20

## 2025-01-08 ENCOUNTER — NON-APPOINTMENT (OUTPATIENT)
Age: 25
End: 2025-01-08

## 2025-01-28 ENCOUNTER — NON-APPOINTMENT (OUTPATIENT)
Age: 25
End: 2025-01-28

## 2025-01-28 ENCOUNTER — APPOINTMENT (OUTPATIENT)
Dept: ORTHOPEDIC SURGERY | Facility: CLINIC | Age: 25
End: 2025-01-28
Payer: MEDICAID

## 2025-01-28 DIAGNOSIS — T84.84XA PAIN DUE TO INTERNAL ORTHOPEDIC PROSTHETIC DEVICES, IMPLANTS AND GRAFTS, INITIAL ENCOUNTER: ICD-10-CM

## 2025-01-28 PROCEDURE — 73140 X-RAY EXAM OF FINGER(S): CPT | Mod: F7,F8

## 2025-01-28 PROCEDURE — 99204 OFFICE O/P NEW MOD 45 MIN: CPT | Mod: 25

## 2025-02-07 ENCOUNTER — APPOINTMENT (OUTPATIENT)
Dept: NEUROLOGY | Facility: CLINIC | Age: 25
End: 2025-02-07
Payer: MEDICAID

## 2025-02-07 VITALS
OXYGEN SATURATION: 100 % | HEART RATE: 79 BPM | WEIGHT: 110 LBS | HEIGHT: 59 IN | SYSTOLIC BLOOD PRESSURE: 102 MMHG | DIASTOLIC BLOOD PRESSURE: 70 MMHG | BODY MASS INDEX: 22.18 KG/M2

## 2025-02-07 DIAGNOSIS — M54.2 CERVICALGIA: ICD-10-CM

## 2025-02-07 DIAGNOSIS — R51.9 HEADACHE, UNSPECIFIED: ICD-10-CM

## 2025-02-07 PROCEDURE — 99213 OFFICE O/P EST LOW 20 MIN: CPT

## 2025-02-11 PROBLEM — M54.2 NECK PAIN: Status: ACTIVE | Noted: 2025-02-11

## 2025-02-21 ENCOUNTER — APPOINTMENT (OUTPATIENT)
Dept: OBGYN | Facility: CLINIC | Age: 25
End: 2025-02-21
Payer: MEDICAID

## 2025-02-21 VITALS
HEART RATE: 91 BPM | SYSTOLIC BLOOD PRESSURE: 111 MMHG | WEIGHT: 110 LBS | BODY MASS INDEX: 22.18 KG/M2 | HEIGHT: 59 IN | OXYGEN SATURATION: 100 % | DIASTOLIC BLOOD PRESSURE: 74 MMHG

## 2025-02-21 DIAGNOSIS — N76.0 ACUTE VAGINITIS: ICD-10-CM

## 2025-02-21 PROCEDURE — 99213 OFFICE O/P EST LOW 20 MIN: CPT

## 2025-02-21 RX ORDER — CLOTRIMAZOLE AND BETAMETHASONE DIPROPIONATE 10; .5 MG/G; MG/G
1-0.05 CREAM TOPICAL TWICE DAILY
Qty: 1 | Refills: 0 | Status: ACTIVE | COMMUNITY
Start: 2025-02-21 | End: 1900-01-01

## 2025-02-24 LAB — BACTERIA UR CULT: NORMAL

## 2025-02-26 LAB
A VAGINAE DNA VAG QL NAA+PROBE: NORMAL
BVAB2 DNA VAG QL NAA+PROBE: NORMAL
C KRUSEI DNA VAG QL NAA+PROBE: NEGATIVE
CANDIDA DNA VAG QL NAA+PROBE: NEGATIVE
MEGA1 DNA VAG QL NAA+PROBE: NORMAL
T VAGINALIS RRNA SPEC QL NAA+PROBE: NEGATIVE

## 2025-03-04 ENCOUNTER — APPOINTMENT (OUTPATIENT)
Dept: ORTHOPEDIC SURGERY | Facility: CLINIC | Age: 25
End: 2025-03-04

## 2025-03-04 DIAGNOSIS — T84.84XA PAIN DUE TO INTERNAL ORTHOPEDIC PROSTHETIC DEVICES, IMPLANTS AND GRAFTS, INITIAL ENCOUNTER: ICD-10-CM

## 2025-03-04 PROCEDURE — 99214 OFFICE O/P EST MOD 30 MIN: CPT

## 2025-04-17 ENCOUNTER — NON-APPOINTMENT (OUTPATIENT)
Age: 25
End: 2025-04-17

## 2025-05-19 ENCOUNTER — NON-APPOINTMENT (OUTPATIENT)
Age: 25
End: 2025-05-19

## 2025-05-20 ENCOUNTER — APPOINTMENT (OUTPATIENT)
Dept: NEUROLOGY | Facility: CLINIC | Age: 25
End: 2025-05-20

## 2025-07-25 ENCOUNTER — APPOINTMENT (OUTPATIENT)
Dept: ORTHOPEDIC SURGERY | Facility: HOSPITAL | Age: 25
End: 2025-07-25

## 2025-08-04 ENCOUNTER — APPOINTMENT (OUTPATIENT)
Dept: ORTHOPEDIC SURGERY | Facility: CLINIC | Age: 25
End: 2025-08-04

## 2025-08-28 ENCOUNTER — APPOINTMENT (OUTPATIENT)
Dept: ORTHOPEDIC SURGERY | Facility: CLINIC | Age: 25
End: 2025-08-28
Payer: MEDICAID

## 2025-08-28 VITALS
BODY MASS INDEX: 24.19 KG/M2 | DIASTOLIC BLOOD PRESSURE: 72 MMHG | WEIGHT: 120 LBS | SYSTOLIC BLOOD PRESSURE: 112 MMHG | HEIGHT: 59 IN | HEART RATE: 90 BPM

## 2025-08-28 DIAGNOSIS — M76.72 PERONEAL TENDINITIS, LEFT LEG: ICD-10-CM

## 2025-08-28 PROCEDURE — 73610 X-RAY EXAM OF ANKLE: CPT | Mod: LT

## 2025-08-28 PROCEDURE — 99203 OFFICE O/P NEW LOW 30 MIN: CPT | Mod: 25
